# Patient Record
Sex: MALE | Race: WHITE | NOT HISPANIC OR LATINO | Employment: STUDENT | URBAN - METROPOLITAN AREA
[De-identification: names, ages, dates, MRNs, and addresses within clinical notes are randomized per-mention and may not be internally consistent; named-entity substitution may affect disease eponyms.]

---
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
## 2023-01-10 ENCOUNTER — OFFICE VISIT (OUTPATIENT)
Dept: INTERNAL MEDICINE | Facility: CLINIC | Age: 18
End: 2023-01-10
Payer: COMMERCIAL

## 2023-01-10 DIAGNOSIS — J32.9 SINUSITIS, UNSPECIFIED CHRONICITY, UNSPECIFIED LOCATION: Primary | ICD-10-CM

## 2023-01-10 PROCEDURE — 99499 UNLISTED E&M SERVICE: CPT | Mod: 95,,, | Performed by: INTERNAL MEDICINE

## 2023-01-10 PROCEDURE — 99499 NO LOS: ICD-10-PCS | Mod: 95,,, | Performed by: INTERNAL MEDICINE

## 2023-01-10 RX ORDER — AMOXICILLIN AND CLAVULANATE POTASSIUM 875; 125 MG/1; MG/1
1 TABLET, FILM COATED ORAL EVERY 12 HOURS
Qty: 14 TABLET | Refills: 0 | Status: SHIPPED | OUTPATIENT
Start: 2023-01-10 | End: 2023-01-17

## 2023-01-10 NOTE — PROGRESS NOTES
Subjective:       Patient ID: Jr Lozano is a 17 y.o. male.    Chief Complaint: Sinus infection (Audio visit)    HPI:  Audio visit. Over a week of sinus congestion, drainage, post-nasal drip with associated mild cough and sore throat. No fevers. Is having yellowish drainage now. Using Flonase and Claritin with some congestion relief.      Past Medical History:   Diagnosis Date    ADHD (attention deficit hyperactivity disorder)     Allergic rhinitis     Plantar warts     Left foot         Current Outpatient Medications:     ADZENYS XR-ODT 18.8 mg TbLB, allow ONE TABLET to DISSOLVE on the tongue once DAILY IN THE MORNING, Disp: , Rfl:     AFLURIA QD 2020-21,3YR UP,,PF, 60 mcg (15 mcg x 4)/0.5 mL Syrg, ADM 0.5ML IM UTD, Disp: , Rfl:     albuterol (PROVENTIL/VENTOLIN HFA) 90 mcg/actuation inhaler, INHALE 2 PUFFS INTO THE LUNGS EVERY 6 (SIX) HOURS AS NEEDED FOR WHEEZING. RESCUE, Disp: 18 g, Rfl: 5    amoxicillin-clavulanate 875-125mg (AUGMENTIN) 875-125 mg per tablet, Take 1 tablet by mouth every 12 (twelve) hours. for 7 days, Disp: 14 tablet, Rfl: 0    dextroamphetamine (DEXTROSTAT) 5 MG tablet, Take 5 mg by mouth once daily., Disp: , Rfl: 0    History reviewed. No pertinent surgical history.    Social History     Tobacco Use    Smoking status: Never    Smokeless tobacco: Never   Substance Use Topics    Alcohol use: No    Drug use: Never         Objective:      Limited: Audio visit only. NAD, pleasant, oriented.      A/P:    1) Sinusitis  - Over a week of symptoms, including sinus pressure and now colored drainage.  - Starting Augmentin x1 week. Continue Flonase and Claritin.

## 2023-03-04 RX ORDER — DIPHENOXYLATE HYDROCHLORIDE AND ATROPINE SULFATE 2.5; .025 MG/1; MG/1
1 TABLET ORAL 3 TIMES DAILY PRN
Qty: 15 TABLET | Refills: 0 | Status: SHIPPED | OUTPATIENT
Start: 2023-03-04 | End: 2023-03-14

## 2023-03-04 RX ORDER — ONDANSETRON HYDROCHLORIDE 8 MG/1
8 TABLET, FILM COATED ORAL EVERY 8 HOURS PRN
Qty: 28 TABLET | Refills: 0 | Status: SHIPPED | OUTPATIENT
Start: 2023-03-04

## 2023-05-19 ENCOUNTER — CLINICAL SUPPORT (OUTPATIENT)
Dept: INFECTIOUS DISEASES | Facility: CLINIC | Age: 18
End: 2023-05-19

## 2023-05-19 ENCOUNTER — OFFICE VISIT (OUTPATIENT)
Dept: INFECTIOUS DISEASES | Facility: CLINIC | Age: 18
End: 2023-05-19

## 2023-05-19 VITALS
HEART RATE: 74 BPM | DIASTOLIC BLOOD PRESSURE: 69 MMHG | SYSTOLIC BLOOD PRESSURE: 112 MMHG | BODY MASS INDEX: 27.33 KG/M2 | TEMPERATURE: 98 F | WEIGHT: 184.5 LBS | HEIGHT: 69 IN

## 2023-05-19 DIAGNOSIS — Z71.84 COUNSELING ABOUT TRAVEL: Primary | ICD-10-CM

## 2023-05-19 PROCEDURE — 90738 JAPANESE ENCEPHALITIS VACCINE: ICD-10-PCS | Mod: S$GLB,,, | Performed by: INTERNAL MEDICINE

## 2023-05-19 PROCEDURE — 99999 PR PBB SHADOW E&M-EST. PATIENT-LVL I: CPT | Mod: PBBFAC,,,

## 2023-05-19 PROCEDURE — 99999 PR PBB SHADOW E&M-EST. PATIENT-LVL III: ICD-10-PCS | Mod: PBBFAC,,, | Performed by: INTERNAL MEDICINE

## 2023-05-19 PROCEDURE — 90738 INACTIVATED JE VACC IM: CPT | Mod: S$GLB,,, | Performed by: INTERNAL MEDICINE

## 2023-05-19 PROCEDURE — 99402 PREV MED CNSL INDIV APPRX 30: CPT | Mod: S$GLB,,, | Performed by: INTERNAL MEDICINE

## 2023-05-19 PROCEDURE — 99999 PR PBB SHADOW E&M-EST. PATIENT-LVL III: CPT | Mod: PBBFAC,,, | Performed by: INTERNAL MEDICINE

## 2023-05-19 PROCEDURE — 90472 PR IMMUNIZ,ADMIN,EACH ADDL: ICD-10-PCS | Mod: S$GLB,,, | Performed by: INTERNAL MEDICINE

## 2023-05-19 PROCEDURE — 90471 IMMUNIZATION ADMIN: CPT | Mod: S$GLB,,, | Performed by: INTERNAL MEDICINE

## 2023-05-19 PROCEDURE — 99402 PR PREVENT COUNSEL,INDIV,30 MIN: ICD-10-PCS | Mod: S$GLB,,, | Performed by: INTERNAL MEDICINE

## 2023-05-19 PROCEDURE — 90691 TYPHOID VACCINE IM: CPT | Mod: S$GLB,,, | Performed by: INTERNAL MEDICINE

## 2023-05-19 PROCEDURE — 90472 IMMUNIZATION ADMIN EACH ADD: CPT | Mod: S$GLB,,, | Performed by: INTERNAL MEDICINE

## 2023-05-19 PROCEDURE — 90691 TYPHOID VICPS VACCINE IM: ICD-10-PCS | Mod: S$GLB,,, | Performed by: INTERNAL MEDICINE

## 2023-05-19 PROCEDURE — 90471 TYPHOID VICPS VACCINE IM: ICD-10-PCS | Mod: S$GLB,,, | Performed by: INTERNAL MEDICINE

## 2023-05-19 PROCEDURE — 99999 PR PBB SHADOW E&M-EST. PATIENT-LVL I: ICD-10-PCS | Mod: PBBFAC,,,

## 2023-05-19 RX ORDER — ATOVAQUONE AND PROGUANIL HYDROCHLORIDE 250; 100 MG/1; MG/1
1 TABLET, FILM COATED ORAL DAILY
Qty: 11 TABLET | Refills: 0 | Status: SHIPPED | OUTPATIENT
Start: 2023-05-19 | End: 2023-06-07 | Stop reason: SDUPTHER

## 2023-05-19 RX ORDER — AZITHROMYCIN 500 MG/1
500 TABLET, FILM COATED ORAL DAILY
Qty: 3 TABLET | Refills: 0 | Status: SHIPPED | OUTPATIENT
Start: 2023-05-19 | End: 2023-05-22

## 2023-05-19 NOTE — PROGRESS NOTES
INFECTIOUS DISEASE TRAVEL CLINIC  05/19/2023 9:50 AM    Subjective:      Chief Complaint:   Chief Complaint   Patient presents with    Travel Consult       History of Present Illness    Patient Jr Lozano is a 17 y.o. male who presents today for routine pretravel consultation.  The patient reports a past medical history of ADHD.  The patient will be traveling to [place] then [place]) 6/10, then [place] to [facility] and [facility]. Will also travel to [facility] (day trip x 1 day).  Returns to  7/3. The purpose of this trip is pleasure/ family trip. The patient will be lodging at hotels.      The patient reports that he has all his childhood vaccinations.  The patient reports receipt of the following travel related vaccinations; hep A.      Currently has a sinus infection. On a z pack.     Have you ever received:  YES NO DATES  Routine Childhood vaccines  [x]         []       Influenza vaccine   [x]         []     Prevnar    []         [x]     Pneumovax    []         [x]     Tetanus-diptheria -pertussis  [x]         []     Hepatitis A vaccine series       [x]         []     Hepatitis B vaccine series         [x]         []     Meningitis vaccine   []         [x]     Zoster vaccine    []         [x]    Typhoid vaccine   []         [x]   Yellow fever vaccine   []         [x]   Japanese encephalitis vaccine []         [x]   Rabies vaccine   []         [x]     PMHx: adhd  Med:   Allergies: NA    Review of Symptoms:  Constitutional: Denies fevers, chills, or weakness.  Cardiovascular: Denies chest pain, palpitations  Respiratory: Denies shortness of breath, cough, hemoptysis  GI: Denies nausea/vomitting,, abd pain  : Denies dysuria  Musculoskeletal: Denies joint pain or myalgias.  Skin/breast: Denies rashes, lumps, lesions, or discharge.  Neurologic: Denies headache     Past Medical History:   Diagnosis Date    ADHD (attention deficit hyperactivity disorder)     Allergic rhinitis     Plantar warts     Left foot        No past surgical history on file.    Family History   Problem Relation Age of Onset    ADD / ADHD Father     Hearing loss Brother         SNHL s/p pnuemococcal meningitis    Blindness Neg Hx     Glaucoma Neg Hx     Macular degeneration Neg Hx     Retinal detachment Neg Hx     Colon cancer Neg Hx        Social History     Socioeconomic History    Marital status: Single   Tobacco Use    Smoking status: Never    Smokeless tobacco: Never   Substance and Sexual Activity    Alcohol use: No    Drug use: Never   Social History Narrative    PMH:no hospitalizations, no surgery    Family History:Father has type 1 diabetes, no immunodeficiencies, no early deaths, no seizure disorders. Brother Ian has profound unilateral SNHL and ataxia s/p pnuemococcal sepsis/meningitis. Also + ADHD, depression, HTN, DM, RAD, substance abuse in both families.    SHx: lives with parents, sister Gisele and younger brother Ian. Parents are attorneys, no lead risks, no smoking at home or in car. No TB exposure.    BH: Term C/S 9#                   Review of patient's allergies indicates:  No Known Allergies      Objective:   VS (24h):   Vitals:    05/19/23 0944   BP: 112/69   Pulse: 74   Temp: 98.1 °F (36.7 °C)     General: Afebrile, alert, comfortable, no acute distress.   HEENT: IMELDA. EOMI, no scleral icterus.   Pulmonary: Non labored  Extremities: Moves all extremities x 4.   Skin: No jaundice, rashes, or visible lesions.   Neurological:  Alert and oriented x 4.     Glucose   Date Value Ref Range Status   06/27/2022 89 70 - 110 mg/dL Final       Calcium   Date Value Ref Range Status   06/27/2022 9.9 8.7 - 10.5 mg/dL Final       Albumin   Date Value Ref Range Status   06/27/2022 4.4 3.5 - 5.2 g/dL Final       Total Protein   Date Value Ref Range Status   06/27/2022 7.2 6.0 - 8.4 g/dL Final       Sodium   Date Value Ref Range Status   06/27/2022 144 136 - 145 mmol/L Final       Potassium   Date Value Ref Range Status   06/27/2022 4.3  3.5 - 5.1 mmol/L Final       CO2   Date Value Ref Range Status   06/27/2022 30 (H) 23 - 29 mmol/L Final       Chloride   Date Value Ref Range Status   06/27/2022 106 95 - 110 mmol/L Final       BUN   Date Value Ref Range Status   06/27/2022 12 6 - 20 mg/dL Final       Creatinine   Date Value Ref Range Status   06/27/2022 0.9 0.5 - 1.4 mg/dL Final       Alkaline Phosphatase   Date Value Ref Range Status   06/27/2022 61 55 - 135 U/L Final       ALT   Date Value Ref Range Status   06/27/2022 31 10 - 44 U/L Final       AST   Date Value Ref Range Status   06/27/2022 20 10 - 40 U/L Final       Total Bilirubin   Date Value Ref Range Status   06/27/2022 1.2 (H) 0.1 - 1.0 mg/dL Final     Comment:     For infants and newborns, interpretation of results should be based  on gestational age, weight and in agreement with clinical  observations.    Premature Infant recommended reference ranges:  Up to 24 hours.............<8.0 mg/dL  Up to 48 hours............<12.0 mg/dL  3-5 days..................<15.0 mg/dL  6-29 days.................<15.0 mg/dL         WBC   Date Value Ref Range Status   06/27/2022 4.03 3.90 - 12.70 K/uL Final   04/24/2018 2.82 (L) 4.50 - 13.50 K/uL Final       Hemoglobin   Date Value Ref Range Status   06/27/2022 15.3 14.0 - 18.0 g/dL Final   04/24/2018 14.5 13.0 - 16.0 g/dL Final       Hematocrit   Date Value Ref Range Status   06/27/2022 46.2 40.0 - 54.0 % Final   04/24/2018 42.1 37.0 - 47.0 % Final       MCV   Date Value Ref Range Status   06/27/2022 95 82 - 98 fL Final   04/24/2018 88 78 - 98 fL Final       Platelets   Date Value Ref Range Status   06/27/2022 230 150 - 450 K/uL Final   04/24/2018 217 150 - 350 K/uL Final       Lab Results   Component Value Date    CHOL 220 (H) 06/27/2022       Lab Results   Component Value Date    HDL 73 06/27/2022       Lab Results   Component Value Date    LDLCALC 133.4 06/27/2022       Lab Results   Component Value Date    TRIG 68 06/27/2022       Lab Results    Component Value Date    [facility] 33.2 06/27/2022       HIV: No components found for: HIV 1/2 AG/AB  Hepatitis C IgG: No components found for: HEPATITIS C  Syphilis: No results found for: RPR    Hepatitis A IgG: No components found for: HEPATITIS A IGG  Hepatitis Bc IgG: No components found for: HEPATITIS B CORE IGG  Hepatitis Bs IgG:  Quantiferon: No results found for: QUANTIFERON        Assessment:     Pre-Travel clinic assessment  Vaccine counseling    Plan:     Patient specific risks:      The patient was provided with an extensive travel guidance packet which provides travel information specific to the patients itinerary.     The patient's medical history was reviewed and the patient was counseled on:    Precautions against development of DVT during flight.    Registering with the state department and travel insurance was recommended in case of emergency.     Destination specific risks:      -Infectious Disease risks:      Mosquito Borne pathogens:  Reviewed basic mosquito avoidance precautions including wearing long sleeve clothing and insect repellant.  to prevent insect-borne diseases (e.g., malaria, dengue).The patient was also instructed to purchase insect repellent containing DEET (25-35%; higher strengths last longer, but >35% will damage synthetic materials) and apply premethrin spray on clothing according to repellent label instructions.    An anti-malarial agent was  prescribed for malaria prophylaxis and possible side effects were reviewed. Atovaquone-proguanil 250-100 mg PO qdaily, start 2 days before arrival at [facility] and end 7 days after last day of exposure.    Food Borne pathogens:  Reviewed basic hand, food and water sanitation precautions.  Patient instructed to take hand  on their trip. The patient was instructed to purchase Imodium over the counter to take in case diarrhea (without blood or fever) develops. Azithromycin was ordered for treatment if severe or bloody diarrhea  develops and the patient was instructed on use and possible side effects.     STDs:  Risk of STDs reviewed with the patient. Discussed Personal protective measures     Rabies: Discussed the risk of rabies and precautions to prevent exposure. Patient is aware to seek prompt medical care should they be exposed.       -Environmental risks:     Personal and travel safety. Precautions to minimize risk/exposure to crime and motor vehicle accidents were reviewed with the patient.    Precautions against sun exposure.      -Vaccinations:  The patient's immunization history was reviewed and, based on the patient's itinerary, the following immunizations were ordered:    - Typhoid IM x1    - Syriac Encephalitis 0, 29 days    The patient was encouraged to contact us about any problems that may develop after immunization and possible side effects were reviewed.      The patient was instructed to contact us if problems develop after travel.    > 30 min spent with patient and >50% of time spent counseling about travel    Nkechi Louie MD  Infectious Diseases    Total professional time spent for the encounter: 30 minutes  Time was spent preparing to see the patient, reviewing results of prior testing, obtaining and/or reviewing separately obtained history, performing a medically appropriate examination and interview, counseling and educating the patient/family, ordering medications/tests/procedures, referring and communicating with other health care professionals, documenting clinical information in the electronic health record, and independently interpreting results.

## 2023-05-19 NOTE — PROGRESS NOTES
Patient received Typhoid & Swedish Encephalitis IM in right arm, pt tolerated well and left clinic NAD after observation.

## 2023-06-02 ENCOUNTER — TELEPHONE (OUTPATIENT)
Dept: INFECTIOUS DISEASES | Facility: CLINIC | Age: 18
End: 2023-06-02
Payer: COMMERCIAL

## 2023-06-02 NOTE — TELEPHONE ENCOUNTER
----- Message from Rehana Greene MA sent at 6/2/2023  4:18 PM CDT -----  Regarding: FW: Travel clinic medication transfer  Contact: [phone]    ----- Message -----  From: He Myers  Sent: 6/2/2023   4:04 PM CDT  To: , #  Subject: Travel clinic medication transfer                Calling in regards to getting medications that were sent to [facility] to [facility] pharmacy. Please call to confirm      [facility]   [facility] [place] [address]  Phone: [phone]

## 2023-06-02 NOTE — TELEPHONE ENCOUNTER
Patient didn't  scripts at [facility] .  They do have Malarone in stock and are able to fill he is calling them to fill again today

## 2023-06-07 RX ORDER — ATOVAQUONE AND PROGUANIL HYDROCHLORIDE 250; 100 MG/1; MG/1
1 TABLET, FILM COATED ORAL DAILY
Qty: 11 TABLET | Refills: 0 | Status: SHIPPED | OUTPATIENT
Start: 2023-06-07 | End: 2023-06-18

## 2023-10-17 ENCOUNTER — PHARMACY VISIT (OUTPATIENT)
Dept: PHARMACY | Facility: CLINIC | Age: 18
End: 2023-10-17

## 2023-10-17 RX ORDER — AMPHETAMINE 18.8 MG/1
18.8 TABLET, ORALLY DISINTEGRATING ORAL EVERY MORNING
Qty: 30 TABLET | Refills: 0 | OUTPATIENT
Start: 2023-10-17

## 2023-10-17 RX ORDER — DEXTROAMPHETAMINE SACCHARATE, AMPHETAMINE ASPARTATE, DEXTROAMPHETAMINE SULFATE AND AMPHETAMINE SULFATE 2.5; 2.5; 2.5; 2.5 MG/1; MG/1; MG/1; MG/1
10-20 TABLET ORAL EVERY EVENING
Qty: 60 TABLET | Refills: 0 | OUTPATIENT
Start: 2023-10-17

## 2023-11-02 ENCOUNTER — PHARMACY VISIT (OUTPATIENT)
Dept: PHARMACY | Facility: CLINIC | Age: 18
End: 2023-11-02
Payer: MEDICARE

## 2023-11-02 PROCEDURE — RXMED WILLOW AMBULATORY MEDICATION CHARGE

## 2024-02-15 PROCEDURE — RXMED WILLOW AMBULATORY MEDICATION CHARGE

## 2024-02-16 ENCOUNTER — PHARMACY VISIT (OUTPATIENT)
Dept: PHARMACY | Facility: CLINIC | Age: 19
End: 2024-02-16
Payer: COMMERCIAL

## 2024-03-27 PROCEDURE — RXMED WILLOW AMBULATORY MEDICATION CHARGE

## 2024-04-01 ENCOUNTER — PHARMACY VISIT (OUTPATIENT)
Dept: PHARMACY | Facility: CLINIC | Age: 19
End: 2024-04-01
Payer: COMMERCIAL

## 2024-05-14 PROCEDURE — RXMED WILLOW AMBULATORY MEDICATION CHARGE

## 2024-05-15 ENCOUNTER — PHARMACY VISIT (OUTPATIENT)
Dept: PHARMACY | Facility: CLINIC | Age: 19
End: 2024-05-15
Payer: COMMERCIAL

## 2024-07-25 ENCOUNTER — PHARMACY VISIT (OUTPATIENT)
Dept: PHARMACY | Facility: CLINIC | Age: 19
End: 2024-07-25
Payer: COMMERCIAL

## 2024-07-25 PROCEDURE — RXMED WILLOW AMBULATORY MEDICATION CHARGE

## 2024-07-25 RX ORDER — AMPHETAMINE 18.8 MG/1
TABLET, ORALLY DISINTEGRATING ORAL
Qty: 30 TABLET | Refills: 0 | OUTPATIENT
Start: 2024-07-25

## 2024-07-25 RX ORDER — DEXTROAMPHETAMINE SACCHARATE, AMPHETAMINE ASPARTATE, DEXTROAMPHETAMINE SULFATE AND AMPHETAMINE SULFATE 2.5; 2.5; 2.5; 2.5 MG/1; MG/1; MG/1; MG/1
TABLET ORAL
Qty: 60 TABLET | Refills: 0 | OUTPATIENT
Start: 2024-07-25

## 2024-07-26 ENCOUNTER — PHARMACY VISIT (OUTPATIENT)
Dept: PHARMACY | Facility: CLINIC | Age: 19
End: 2024-07-26
Payer: COMMERCIAL

## 2024-09-05 PROCEDURE — RXMED WILLOW AMBULATORY MEDICATION CHARGE

## 2024-09-09 ENCOUNTER — PHARMACY VISIT (OUTPATIENT)
Dept: PHARMACY | Facility: CLINIC | Age: 19
End: 2024-09-09
Payer: COMMERCIAL

## 2024-10-22 PROCEDURE — RXMED WILLOW AMBULATORY MEDICATION CHARGE

## 2024-10-28 ENCOUNTER — PHARMACY VISIT (OUTPATIENT)
Dept: PHARMACY | Facility: CLINIC | Age: 19
End: 2024-10-28
Payer: COMMERCIAL

## 2024-11-05 PROCEDURE — RXMED WILLOW AMBULATORY MEDICATION CHARGE

## 2024-11-07 ENCOUNTER — PHARMACY VISIT (OUTPATIENT)
Dept: PHARMACY | Facility: CLINIC | Age: 19
End: 2024-11-07
Payer: COMMERCIAL

## 2024-11-27 PROCEDURE — RXMED WILLOW AMBULATORY MEDICATION CHARGE

## 2024-12-02 ENCOUNTER — PHARMACY VISIT (OUTPATIENT)
Dept: PHARMACY | Facility: CLINIC | Age: 19
End: 2024-12-02
Payer: COMMERCIAL

## 2025-02-04 PROCEDURE — RXMED WILLOW AMBULATORY MEDICATION CHARGE

## 2025-02-06 ENCOUNTER — PHARMACY VISIT (OUTPATIENT)
Dept: PHARMACY | Facility: CLINIC | Age: 20
End: 2025-02-06
Payer: COMMERCIAL

## 2025-02-07 PROCEDURE — RXMED WILLOW AMBULATORY MEDICATION CHARGE

## 2025-02-10 ENCOUNTER — PHARMACY VISIT (OUTPATIENT)
Dept: PHARMACY | Facility: CLINIC | Age: 20
End: 2025-02-10
Payer: COMMERCIAL

## 2025-03-24 PROCEDURE — RXMED WILLOW AMBULATORY MEDICATION CHARGE

## 2025-03-25 ENCOUNTER — PHARMACY VISIT (OUTPATIENT)
Dept: PHARMACY | Facility: CLINIC | Age: 20
End: 2025-03-25
Payer: COMMERCIAL

## 2025-04-29 PROCEDURE — RXMED WILLOW AMBULATORY MEDICATION CHARGE

## 2025-04-30 ENCOUNTER — PHARMACY VISIT (OUTPATIENT)
Dept: PHARMACY | Facility: CLINIC | Age: 20
End: 2025-04-30
Payer: COMMERCIAL

## 2025-06-23 PROCEDURE — RXMED WILLOW AMBULATORY MEDICATION CHARGE

## 2025-06-24 ENCOUNTER — PHARMACY VISIT (OUTPATIENT)
Dept: PHARMACY | Facility: CLINIC | Age: 20
End: 2025-06-24
Payer: COMMERCIAL

## 2025-08-13 PROCEDURE — RXMED WILLOW AMBULATORY MEDICATION CHARGE

## 2025-08-14 ENCOUNTER — PHARMACY VISIT (OUTPATIENT)
Dept: PHARMACY | Facility: CLINIC | Age: 20
End: 2025-08-14
Payer: COMMERCIAL